# Patient Record
(demographics unavailable — no encounter records)

---

## 2024-12-19 NOTE — HISTORY OF PRESENT ILLNESS
[Mother] : mother [Fruit] : fruit [Vegetables] : vegetables [Meat] : meat [Cereal] : cereal [Eggs] : eggs [Dairy] : dairy [Vitamins] : Patient takes vitamin daily [___ stools per day] : [unfilled]  stools per day [___ voids per day] : [unfilled] voids per day [Normal] : Normal [In bed] : In bed [Brushing teeth] : Brushing teeth [Tap water] : Primary Fluoride Source: Tap water [Toilet Training] : Toilet training [No] : Not at  exposure [Car seat in back seat] : Car seat in back seat [Smoke Detectors] : Smoke detectors [Delayed] : Delayed [NO] : No [Cow's milk (Ounces per day ___)] : consumes [unfilled] oz of Cow's milk per day [Carbon Monoxide Detectors] : No carbon monoxide detectors [Exposure to electronic nicotine delivery system] : No exposure to electronic nicotine delivery system [de-identified] : no red meat yet, multivitamin daily  [de-identified] : Brushes teeth twice a day.  [FreeTextEntry9] : No concerns for behavior.  [de-identified] : Due for flu, hepatitis A, and varicella vaccines.  [FreeTextEntry1] : 1 yo with eczema here for WCC. Went to ED in Oct 2024 for anaphylaxis after ingesting orange-flavored cookie; face swollen, periorbital swelling, vomited. Did not administer epi-pen because it was . At ED, gave benadryl and epi-pen and discharged with a new epi-pen. A&I in 2024 told patient to avoid fish, egg whites, peanut, tree nuts, spinach, peas, pumpkin. A&I plan to do baked egg challenge, and fresh pumpkin and spinach for prick testing. Patient has been whole scrambled egg daily at home for 6-7 months with no reaction. Does not apply hydrocortisone cream anymore on eczematic rashes. ENT in 2024 follow up for ear infection, was told conservative management. In , 3-4 ear infections. Urology in 2024 for phimosis and lysed adhesions, no surgical intervention. Went to ophtho in 2024 for chalazon, did not receive antibiotics, told to lid hygiene TID b/l (warm compress and soap wash), resolved. Concern for oral odor for 2-3 months, intermittently throughout the day for most days. Brushes twice a day, has not yet seen a dentist.

## 2024-12-19 NOTE — REVIEW OF SYSTEMS
[Rash] : rash [Eye Discharge] : no eye discharge [Nasal Discharge] : no nasal discharge [Tachypnea] : not tachypneic [Wheezing] : no wheezing [Cough] : no cough [Vomiting] : no vomiting [Diarrhea] : no diarrhea [Restriction of Motion] : no restriction of motion

## 2024-12-19 NOTE — DISCUSSION/SUMMARY
[] : The components of the vaccine(s) to be administered today are listed in the plan of care. The disease(s) for which the vaccine(s) are intended to prevent and the risks have been discussed with the caretaker.  The risks are also included in the appropriate vaccination information statements which have been provided to the patient's caregiver.  The caregiver has given consent to vaccinate. [Normal Development] : development [Assessment of Language Development] : assessment of language development [Temperament and Behavior] : temperament and behavior [Toilet Training] : toilet training [TV Viewing] : tv viewing [Safety] : safety [FreeTextEntry1] : This is a 3 yo with food allergies and eczema presenting for C.   Health Maintenance: Gained 2.83 kg since 9/22/24. Discussed no longer giving whole milk to reduce risk of obesity. Concern for oral odor by parent, patient brushing twice daily, no concern for vomiting, patient has not yet seen a dentist, counseled on seeing a dentist this year. No stooling, voiding, sleep, behavior concerns. Counseled on purchasing a carbon monoxide detector. Received flu, hep A, varicella vaccines.   #Oral Odor -Patient to follow up with dentist   #Eczema & Allergies -Counseled to follow up on allergy management with A&I -Counseled on continuing to apply emollients regularly, and apply hydrocortisone cream as needed for flare -ups, follow up with A&I.  -Mother endorses patient has an up to date epi-pen after ED visit  Follow up at 30 mo WCC visit.

## 2024-12-19 NOTE — DEVELOPMENTAL MILESTONES
[Plays alongside other children] : plays alongside other children [Takes off some clothing] : takes off some clothing [Uses 50 words] : uses 50 words [Combine 2 words into phrase or] : combines 2 words into phrase or sentences [Follows 2-step command] : follows 2-step command [Uses words that are 50% intelligible] : uses words that are 50% intelligible to strangers [Kicks ball] : kicks ball  [Jumps off ground with 2 feet] : jumps off ground with 2 feet [Runs with coordination] : runs with coordination [Climbs up a ladder at a] : climbs up a ladder at a playground [Stacks objects] : stacks objects [Turns book pages] : turns book pages [Uses hands to turn objects] : uses hands to turn objects [Passed] : passed [Yes] : Completed. [Scoops well with spoon] : does not scoop well with spoon [FreeTextEntry1] : No concerns on SWYC

## 2024-12-19 NOTE — PHYSICAL EXAM
[Alert] : alert [No Acute Distress] : no acute distress [Normocephalic] : normocephalic [Red Reflex Bilateral] : red reflex bilateral [PERRL] : PERRL [Normally Placed Ears] : normally placed ears [Clear Tympanic membranes with present light reflex and bony landmarks] : clear tympanic membranes with present light reflex and bony landmarks [No Discharge] : no discharge [Palate Intact] : palate intact [Tooth Eruption] : tooth eruption  [Supple, full passive range of motion] : supple, full passive range of motion [Symmetric Chest Rise] : symmetric chest rise [Clear to Auscultation Bilaterally] : clear to auscultation bilaterally [Regular Rate and Rhythm] : regular rate and rhythm [S1, S2 present] : S1, S2 present [Soft] : soft [NonTender] : non tender [Non Distended] : non distended [Tim 1] : Tim 1 [Testicles Descended Bilaterally] : testicles descended bilaterally [Cranial Nerves Grossly Intact] : cranial nerves grossly intact [de-identified] : Dry skin patches in elbow flexors

## 2025-01-02 NOTE — DISCUSSION/SUMMARY
[FreeTextEntry1] : Mild symptoms, no respiratory distress, maintaining hydration, no sign of bacterial superinfection, well appearing, normal exam, consistent with viral URI. Serous effusions likely source of ear pain. Recommended nasal saline spray twice daily to help drain sinus and thus ear tubes. Recent mycoplasma diagnosis likely imparted immunity, low risk for second infection, and reviewed 95% of mycoplasma infections improve without treatment. Deferred covid/flu testing as would defer treatment.  - Cold symptoms can last up to 10 to 14 days on average, sometimes longer - Keep your child well hydrated - Can use nasal saline drops/spray and humidifier for congestion and cough control - Can use Acetaminophen or Ibuprofen (given with food) every 6 hours as needed for discomfort or symptoms caused by fever.  - Do not use of over the counter decongestants or cough suppressants (especially if less than 6 years of age due to side effects) - Can use honey, 1 tablespoon twice daily, for cough (ONLY if older than 1 year of age, dangerous if your child is less than 1 year of age) - Call clinic for continued high fever past 4-5 days for in office visit and further evaluation as this may be a sign of bacterial infection - Call office for any worsening or parental concern - Seek emergency medical attention if your child cannot tolerate anything by mouth and they pee less than 3 times in one day, patient becomes less alert or difficulty waking from sleep, have difficulty breathing (too fast or too hard), or has work of breathing such as retractions that are persistent. All of these symptoms require an emergent evaluation to rule out serious disease

## 2025-01-02 NOTE — HISTORY OF PRESENT ILLNESS
[FreeTextEntry6] : Here with Mom for 2 days of runny nose and cough Tmax 100.8 yesterday Reporting ear pain Went to  Saturday, normal exam, no testing done Thought had ear swelling but resolved Normal PO/UOP No fast breathing, vomiting, diarrhea, rashes Recently had mycoplasma diagnosis, wondering if its back

## 2025-04-02 NOTE — IMPRESSION
[Allergy Testing Dust Mite] : dust mites [Allergy Testing Mixed Feathers] : feathers [Allergy Testing Cockroach] : cockroach [Allergy Testing Dog] : dog [Allergy Testing Cat] : cat [_____] : vegetables ([unfilled]) [] : shellfish

## 2025-04-04 NOTE — REASON FOR VISIT
[Initial Consultation] : an initial consultation for [Allergy Evaluation/ Skin Testing] : allergy evaluation and or skin testing [Congestion] : congestion [Eczema] : eczema [Parents] : parents [Medical Records] : medical records [FreeTextEntry2] : allergies

## 2025-04-04 NOTE — SOCIAL HISTORY
[House] : [unfilled] lives in a house  [Humidifier] : uses a humidifier [None] : none [Cockroaches] : Patient states that there are no cockroaches in the home [Dust Mite Covers] : does not have dust mite covers [Feather Pillows] : does not have feather pillows [Feather Comforter] : does not have a feather comforter [Bedroom] : not in the bedroom [Basement] : not in the basement [Smokers in Household] : there are no smokers in the home

## 2025-04-04 NOTE — REVIEW OF SYSTEMS
[Nl] : Genitourinary [FreeTextEntry4] : see hpi [FreeTextEntry6] : see hpi [de-identified] : see hpi [de-identified] : see hpi

## 2025-04-04 NOTE — PHYSICAL EXAM
[Alert] : alert [Well Nourished] : well nourished [Healthy Appearance] : healthy appearance [No Acute Distress] : no acute distress [Well Developed] : well developed [Normal Pupil & Iris Size/Symmetry] : normal pupil and iris size and symmetry [No Discharge] : no discharge [No Photophobia] : no photophobia [Sclera Not Icteric] : sclera not icteric [Normal TMs] : both tympanic membranes were normal [Normal Nasal Mucosa] : the nasal mucosa was normal [Normal Lips/Tongue] : the lips and tongue were normal [Normal Outer Ear/Nose] : the ears and nose were normal in appearance [Normal Tonsils] : normal tonsils [No Thrush] : no thrush [Supple] : the neck was supple [Normal Rate and Effort] : normal respiratory rhythm and effort [No Crackles] : no crackles [No Retractions] : no retractions [Bilateral Audible Breath Sounds] : bilateral audible breath sounds [Normal Rate] : heart rate was normal  [Normal S1, S2] : normal S1 and S2 [No murmur] : no murmur [Regular Rhythm] : with a regular rhythm [Soft] : abdomen soft [Not Tender] : non-tender [Not Distended] : not distended [No HSM] : no hepato-splenomegaly [Normal Cervical Lymph Nodes] : cervical [Skin Intact] : skin intact  [No Rash] : no rash [No Skin Lesions] : no skin lesions [No clubbing] : no clubbing [No Edema] : no edema [No Cyanosis] : no cyanosis [Normal Mood] : mood was normal [Normal Affect] : affect was normal [Alert, Awake, Oriented as Age-Appropriate] : alert, awake, oriented as age appropriate [No Motor Deficits] : the motor exam was normal

## 2025-04-04 NOTE — HISTORY OF PRESENT ILLNESS
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 Last visit 5/3/24.  Allergy Hx: TREE NUT- tried small bite of almond (see below) but never any other tree nuts Cashew 18.1, Jacqueline o3 13.15 Pistachio 16.6 [place] 2.45 -last year tried a small amount of almond cookie by accident he 5-10 min. developed hives and sneezing, took cetirizine  Yary nut 5.54, cor a14 1.68, cora9 7.97 Pecan 0.12 [place] 1.1, jugr1 1.08 Pine nut 0.12  PEANUT - never tried- 2.87, rob h1 3.34, rob h2 <0.1, rob h3 1.85, rob h6 <0.1  FISH Vomiting and hives with yellowtail catfish and cod, last tried May 2024  Cod: 15.1,  Tuna: 10.9 [place]: 10.7 Halibut 6.27 flounder 17.8 Tilapia 24.1  EGG- now tolerates scrambled egg which he eats every day. Ovalbumin 1.8 Ovmucoid 0.3 Egg white 2.54  PEA- 7-8 months old mixed veggies with string beans, peas hives over body >2 hours after- 6.41, SPT 2x2 Avoids String beans, peas, okra (tolerates spinach, broccoli)- one reaction at 7-8 months   Beef: Has never tried, parents afraid to introduce   In Oct. 2024 had Brittania orange cookies and immediately broke out in hives, called 911 and got Epi. Sesame is third ingredient. Has never tried sesame since.   Peanuts- never tried Tree nuts- never tried  Fish- reaction  Shellfish- never tried   Beef- never tried    Develops sneezing when he eats Banana but continues eating   Denies random hives     ECZEMA  improved Using mometasone a couple of times a month, mostly behind his knees Using Vaseline to moisturize- using all the time  Controlled with Vaseline  No scented products   Stuffy nose when he sleeps all year around. Mom has to use two pillows

## 2025-04-04 NOTE — HISTORY OF PRESENT ILLNESS
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 Last visit 5/3/24.  Allergy Hx: TREE NUT- tried small bite of almond (see below) but never any other tree nuts Cashew 18.1, Jacqueline o3 13.15 Pistachio 16.6 [place] 2.45 -last year tried a small amount of almond cookie by accident he 5-10 min. developed hives and sneezing, took cetirizine  Yary nut 5.54, cor a14 1.68, cora9 7.97 Pecan 0.12 [place] 1.1, jugr1 1.08 Pine nut 0.12  PEANUT - never tried- 2.87, rob h1 3.34, rob h2 <0.1, rob h3 1.85, rob h6 <0.1  FISH Vomiting and hives with yellowtail catfish and cod, last tried May 2024  Cod: 15.1,  Tuna: 10.9 [place]: 10.7 Halibut 6.27 flounder 17.8 Tilapia 24.1  EGG- now tolerates scrambled egg which he eats every day. Ovalbumin 1.8 Ovmucoid 0.3 Egg white 2.54  PEA- 7-8 months old mixed veggies with string beans, peas hives over body >2 hours after- 6.41, SPT 2x2 Avoids String beans, peas, okra (tolerates spinach, broccoli)- one reaction at 7-8 months   Beef: Has never tried, parents afraid to introduce   In Oct. 2024 had Brittania orange cookies and immediately broke out in hives, called 911 and got Epi. Sesame is third ingredient. Has never tried sesame since.   Peanuts- never tried Tree nuts- never tried  Fish- reaction  Shellfish- never tried   Beef- never tried    Develops sneezing when he eats Banana but continues eating   Denies random hives     ECZEMA  improved Using mometasone a couple of times a month, mostly behind his knees Using Vaseline to moisturize- using all the time  Controlled with Vaseline  No scented products   Stuffy nose when he sleeps all year around. Mom has to use two pillows

## 2025-04-04 NOTE — REVIEW OF SYSTEMS
[Nl] : Genitourinary [FreeTextEntry4] : see hpi [FreeTextEntry6] : see hpi [de-identified] : see hpi [de-identified] : see hpi

## 2025-05-16 NOTE — DEVELOPMENTAL MILESTONES
[Normal Development] : Normal Development [Urinates in a potty or toilet] : urinates in a potty or toilet [Plays pretend with toys or dolls] : plays pretend with toys or dolls [Uses pronouns correctly] : uses pronouns correctly [Explains the reason for things,] : explains the reason for things, such as needing a sweater when it's cold [Names at least one color] : names at least one color [Walks up steps, using one] : walks up steps, using one foot, then the other [Runs well without falling] : runs well without falling [Grasps crayon with thumb] : grasps crayon with thumb and fingers instead of fist [Catches a large ball] : catches a large ball [Copies a vertical line] : copies a vertical line [Passed] : passed [Pokes food with fork] : does not poke food with fork [FreeTextEntry1] : Full sentences in Georgian.

## 2025-05-16 NOTE — HISTORY OF PRESENT ILLNESS
[Fruit] : fruit [Vegetables] : vegetables [Meat] : meat [Grains] : grains [Eggs] : eggs [Dairy] : dairy [Normal] : Normal [In bed] : In bed [Brushing teeth] : Brushing teeth [Toothpaste] : Primary Fluoride Source: Toothpaste [Playtime (60 min/d)] : Playtime 60 min a day [< 2 hrs of screen time] : Less than 2 hrs of screen time [No] : No cigarette smoke exposure [Car seat in back seat] : Car seat in back seat [Carbon Monoxide Detectors] : Carbon monoxide detectors [Smoke Detectors] : Smoke detectors [Up to date] : Up to date [NO] : No [Exposure to electronic nicotine delivery system] : No exposure to electronic nicotine delivery system [FreeTextEntry7] : peanut, tree nut, shellfish, sesame allergy. ED for allergic reaction requiring epi by EMS to sesame in oct 2024.  [de-identified] : Breakfast- bread w milk. lunch veggie and rice and chicken. snack- smoothie w fruit, oatmeal. Dinner- vegetable and rice and egg.  8oz water. 4oz juice daily that mom makes from fresh fruit she squeezes. No soda. 5-8oz 1% milk daily.  [FreeTextEntry8] : kevin trained at home, wears diapers when out of house. 6 urine. 1-2 BM daily. soft. [de-identified] : regular cup

## 2025-05-16 NOTE — END OF VISIT
Left message for patient regarding below message. Writer asked patient to call us back to see if appt. Works or to call and reschedule. Placed on schedule for now until patient calls back.  
[] : Resident
[] : Resident
Handoff

## 2025-05-16 NOTE — DISCUSSION/SUMMARY
[Normal Growth] : growth [Normal Development] : development [None] : No known medical problems [No Elimination Concerns] : elimination [No Feeding Concerns] : feeding [Normal Sleep Pattern] : sleep [Language Promotion and Communication] : language promotion and communication [Safety] : safety [Mother] : mother [Father] : father [FreeTextEntry1] : 2.5y M w PMHx of G6PD def, multiple food allergies and eczema p/f 2.5y WCC.  #WCC - pt developing and progressing appropriately, wt and ht in 99%tile  - discussed need to see dental, dental list provided - discussed inc physical activity - discussed limiting high tyree foods, sugar intake - VUTD - RTC for 3y WCC or sooner prn   # eczema - discussed good skin hygiene  - alclometasone refilled - currently using mometasone, vaseline, cerave cream. unscented products.   # allergic conjunctivitis - pataday refilled  - seen by [facility]

## 2025-05-16 NOTE — PHYSICAL EXAM
[Alert] : alert [No Acute Distress] : no acute distress [Playful] : playful [Normocephalic] : normocephalic [Atraumatic] : atraumatic [Conjunctivae with no discharge] : conjunctivae with no discharge [EOMI Bilateral] : EOMI bilateral [Auricles Well Formed] : auricles well formed [Clear Tympanic membranes with present light reflex and bony landmarks] : clear tympanic membranes with present light reflex and bony landmarks [No Discharge] : no discharge [Nares Patent] : nares patent [Pink Nasal Mucosa] : pink nasal mucosa [Palate Intact] : palate intact [Uvula Midline] : uvula midline [Nonerythematous Oropharynx] : nonerythematous oropharynx [No Caries] : no caries [Trachea Midline] : trachea midline [Supple, full passive range of motion] : supple, full passive range of motion [No Palpable Masses] : no palpable masses [Symmetric Chest Rise] : symmetric chest rise [Clear to Auscultation Bilaterally] : clear to auscultation bilaterally [Normoactive Precordium] : normoactive precordium [Regular Rate and Rhythm] : regular rate and rhythm [Normal S1, S2 present] : normal S1, S2 present [No Murmurs] : no murmurs [Soft] : soft [NonTender] : non tender [Non Distended] : non distended [Normoactive Bowel Sounds] : normoactive bowel sounds [No Hepatomegaly] : no hepatomegaly [No Splenomegaly] : no splenomegaly [Tim 1] : Tim 1 [Central Urethral Opening] : central urethral opening [Testicles Descended Bilaterally] : testicles descended bilaterally [Patent] : patent [Normally Placed] : normally placed [No Abnormal Lymph Nodes Palpated] : no abnormal lymph nodes palpated [No Gait Asymmetry] : no gait asymmetry [No pain or deformities with palpation of bone, muscles, joints] : no pain or deformities with palpation of bone, muscles, joints [Normal Muscle Tone] : normal muscle tone [No Spinal Dimple] : no spinal dimple [NoTuft of Hair] : no tuft of hair [Straight] : straight [Cranial Nerves Grossly Intact] : cranial nerves grossly intact [de-identified] : areas of post inflammatory hyperpigmentation, patches of dry skin on legs and arms

## 2025-05-16 NOTE — HISTORY OF PRESENT ILLNESS
[Fruit] : fruit [Vegetables] : vegetables [Meat] : meat [Grains] : grains [Eggs] : eggs [Dairy] : dairy [Normal] : Normal [In bed] : In bed [Brushing teeth] : Brushing teeth [Toothpaste] : Primary Fluoride Source: Toothpaste [Playtime (60 min/d)] : Playtime 60 min a day [< 2 hrs of screen time] : Less than 2 hrs of screen time [No] : No cigarette smoke exposure [Car seat in back seat] : Car seat in back seat [Carbon Monoxide Detectors] : Carbon monoxide detectors [Smoke Detectors] : Smoke detectors [Up to date] : Up to date [NO] : No [Exposure to electronic nicotine delivery system] : No exposure to electronic nicotine delivery system [FreeTextEntry7] : peanut, tree nut, shellfish, sesame allergy. ED for allergic reaction requiring epi by EMS to sesame in oct 2024.  [de-identified] : Breakfast- bread w milk. lunch veggie and rice and chicken. snack- smoothie w fruit, oatmeal. Dinner- vegetable and rice and egg.  8oz water. 4oz juice daily that mom makes from fresh fruit she squeezes. No soda. 5-8oz 1% milk daily.  [FreeTextEntry8] : kevin trained at home, wears diapers when out of house. 6 urine. 1-2 BM daily. soft. [de-identified] : regular cup

## 2025-05-16 NOTE — DEVELOPMENTAL MILESTONES
[Normal Development] : Normal Development [Urinates in a potty or toilet] : urinates in a potty or toilet [Plays pretend with toys or dolls] : plays pretend with toys or dolls [Uses pronouns correctly] : uses pronouns correctly [Explains the reason for things,] : explains the reason for things, such as needing a sweater when it's cold [Names at least one color] : names at least one color [Walks up steps, using one] : walks up steps, using one foot, then the other [Runs well without falling] : runs well without falling [Grasps crayon with thumb] : grasps crayon with thumb and fingers instead of fist [Catches a large ball] : catches a large ball [Copies a vertical line] : copies a vertical line [Passed] : passed [Pokes food with fork] : does not poke food with fork [FreeTextEntry1] : Full sentences in Irish.

## 2025-05-16 NOTE — PHYSICAL EXAM
[Alert] : alert [No Acute Distress] : no acute distress [Playful] : playful [Normocephalic] : normocephalic [Atraumatic] : atraumatic [Conjunctivae with no discharge] : conjunctivae with no discharge [EOMI Bilateral] : EOMI bilateral [Auricles Well Formed] : auricles well formed [Clear Tympanic membranes with present light reflex and bony landmarks] : clear tympanic membranes with present light reflex and bony landmarks [No Discharge] : no discharge [Nares Patent] : nares patent [Pink Nasal Mucosa] : pink nasal mucosa [Palate Intact] : palate intact [Uvula Midline] : uvula midline [Nonerythematous Oropharynx] : nonerythematous oropharynx [No Caries] : no caries [Trachea Midline] : trachea midline [Supple, full passive range of motion] : supple, full passive range of motion [No Palpable Masses] : no palpable masses [Symmetric Chest Rise] : symmetric chest rise [Clear to Auscultation Bilaterally] : clear to auscultation bilaterally [Normoactive Precordium] : normoactive precordium [Regular Rate and Rhythm] : regular rate and rhythm [Normal S1, S2 present] : normal S1, S2 present [No Murmurs] : no murmurs [Soft] : soft [NonTender] : non tender [Non Distended] : non distended [Normoactive Bowel Sounds] : normoactive bowel sounds [No Hepatomegaly] : no hepatomegaly [No Splenomegaly] : no splenomegaly [Tim 1] : Tim 1 [Central Urethral Opening] : central urethral opening [Testicles Descended Bilaterally] : testicles descended bilaterally [Patent] : patent [Normally Placed] : normally placed [No Abnormal Lymph Nodes Palpated] : no abnormal lymph nodes palpated [No Gait Asymmetry] : no gait asymmetry [No pain or deformities with palpation of bone, muscles, joints] : no pain or deformities with palpation of bone, muscles, joints [Normal Muscle Tone] : normal muscle tone [No Spinal Dimple] : no spinal dimple [NoTuft of Hair] : no tuft of hair [Straight] : straight [Cranial Nerves Grossly Intact] : cranial nerves grossly intact [de-identified] : areas of post inflammatory hyperpigmentation, patches of dry skin on legs and arms